# Patient Record
Sex: MALE
[De-identification: names, ages, dates, MRNs, and addresses within clinical notes are randomized per-mention and may not be internally consistent; named-entity substitution may affect disease eponyms.]

---

## 2017-04-17 PROBLEM — Z00.00 ENCOUNTER FOR PREVENTIVE HEALTH EXAMINATION: Status: ACTIVE | Noted: 2017-04-17

## 2017-05-16 ENCOUNTER — APPOINTMENT (OUTPATIENT)
Dept: VASCULAR SURGERY | Facility: CLINIC | Age: 72
End: 2017-05-16

## 2020-09-11 ENCOUNTER — APPOINTMENT (OUTPATIENT)
Dept: VASCULAR SURGERY | Facility: CLINIC | Age: 75
End: 2020-09-11
Payer: MEDICARE

## 2020-09-11 VITALS — HEIGHT: 68 IN | BODY MASS INDEX: 24.25 KG/M2 | WEIGHT: 160 LBS

## 2020-09-11 VITALS — TEMPERATURE: 97.1 F | DIASTOLIC BLOOD PRESSURE: 86 MMHG | SYSTOLIC BLOOD PRESSURE: 136 MMHG

## 2020-09-11 DIAGNOSIS — Z87.19 PERSONAL HISTORY OF OTHER DISEASES OF THE DIGESTIVE SYSTEM: ICD-10-CM

## 2020-09-11 DIAGNOSIS — Z86.79 PERSONAL HISTORY OF OTHER DISEASES OF THE CIRCULATORY SYSTEM: ICD-10-CM

## 2020-09-11 DIAGNOSIS — Z78.9 OTHER SPECIFIED HEALTH STATUS: ICD-10-CM

## 2020-09-11 DIAGNOSIS — Z86.39 PERSONAL HISTORY OF OTHER ENDOCRINE, NUTRITIONAL AND METABOLIC DISEASE: ICD-10-CM

## 2020-09-11 DIAGNOSIS — F17.200 NICOTINE DEPENDENCE, UNSPECIFIED, UNCOMPLICATED: ICD-10-CM

## 2020-09-11 PROCEDURE — 93925 LOWER EXTREMITY STUDY: CPT

## 2020-09-11 PROCEDURE — 99213 OFFICE O/P EST LOW 20 MIN: CPT | Mod: 25

## 2020-09-11 PROCEDURE — 93880 EXTRACRANIAL BILAT STUDY: CPT

## 2020-09-11 NOTE — ASSESSMENT
[FreeTextEntry1] : The patient is a 73 yo male who was last to follow up with a history of a Left CEA and now with new onset calf pain with cycling. I preformed an arterial duplex which shows no significant or focal stenosis present. I also performed a carotid duplex which shows a widely patent left internal carotid artery endarterectomy siteand the right internal carotid artery stenosis less than 50%. From my standpoint no vascular surgery intervention at this time I would like to see him back in my office in one years time for a carotid duplex. I also recommended smoking cessation.

## 2020-09-11 NOTE — HISTORY OF PRESENT ILLNESS
[FreeTextEntry1] : The patient is a 74-year-old male known to me with a history of left  carotid endarterectomies in 2011. The patient has been lost to followup for 2016 Today he presents complaining of bilateral claudication symptoms when he rides his bike or ambulates up an incline.

## 2020-09-11 NOTE — DATA REVIEWED
[FreeTextEntry1] : arterial duplex right patent femoral and popliteal tibial arteries with no evidence of stenosis\par    Left patent femoral and popliteal tibial arteries with no evidence of stenosis\par Carotid duplex i patent carotid endarterectomy on the left right < 50% stenosis\par \par \par

## 2021-05-11 ENCOUNTER — APPOINTMENT (OUTPATIENT)
Dept: VASCULAR SURGERY | Facility: CLINIC | Age: 76
End: 2021-05-11
Payer: MEDICARE

## 2021-05-11 VITALS
BODY MASS INDEX: 24.25 KG/M2 | HEART RATE: 86 BPM | TEMPERATURE: 97.3 F | DIASTOLIC BLOOD PRESSURE: 69 MMHG | WEIGHT: 160 LBS | SYSTOLIC BLOOD PRESSURE: 123 MMHG | HEIGHT: 68 IN

## 2021-05-11 DIAGNOSIS — I70.219 ATHEROSCLEROSIS OF NATIVE ARTERIES OF EXTREMITIES WITH INTERMITTENT CLAUDICATION, UNSPECIFIED EXTREMITY: ICD-10-CM

## 2021-05-11 DIAGNOSIS — M79.89 OTHER SPECIFIED SOFT TISSUE DISORDERS: ICD-10-CM

## 2021-05-11 DIAGNOSIS — I65.23 OCCLUSION AND STENOSIS OF BILATERAL CAROTID ARTERIES: ICD-10-CM

## 2021-05-11 PROCEDURE — 93970 EXTREMITY STUDY: CPT

## 2021-05-11 PROCEDURE — 99213 OFFICE O/P EST LOW 20 MIN: CPT

## 2021-05-11 NOTE — ASSESSMENT
[FreeTextEntry1] : The patient is a 75 yo male who was last to follow up with a history of a Left CEA and complains of  calf pain with cycling. I preformed an arterial duplex which shows no significant or focal stenosis present in September. The patient had palpable pulses. I also performed a venous duplex that showed no evidence of DVT. From my standpoint no vascular surgery intervention at this time I would like to see him back in my office in one years time for a carotid duplex. I also recommended he follow up for a lower back evaluation. He may require an MRI of his spine.

## 2021-05-11 NOTE — HISTORY OF PRESENT ILLNESS
[FreeTextEntry1] : The patient is a 75-year-old male known to me with a history of left  carotid endarterectomies in 2011. The patient presents complains of a burning sensation in his legs and intermittent swelling.

## 2022-05-10 ENCOUNTER — APPOINTMENT (OUTPATIENT)
Dept: VASCULAR SURGERY | Facility: CLINIC | Age: 77
End: 2022-05-10